# Patient Record
Sex: FEMALE | Race: WHITE | NOT HISPANIC OR LATINO | Employment: OTHER | URBAN - METROPOLITAN AREA
[De-identification: names, ages, dates, MRNs, and addresses within clinical notes are randomized per-mention and may not be internally consistent; named-entity substitution may affect disease eponyms.]

---

## 2017-01-12 ENCOUNTER — HOSPITAL ENCOUNTER (OUTPATIENT)
Dept: MAMMOGRAPHY | Facility: HOSPITAL | Age: 72
Discharge: HOME | End: 2017-01-12
Admitting: INTERNAL MEDICINE

## 2017-01-12 DIAGNOSIS — Z12.31 VISIT FOR SCREENING MAMMOGRAM: ICD-10-CM

## 2017-01-12 PROCEDURE — 77063 BREAST TOMOSYNTHESIS BI: CPT

## 2017-01-12 PROCEDURE — 77063 BREAST TOMOSYNTHESIS BI: CPT | Performed by: RADIOLOGY

## 2017-01-12 PROCEDURE — G0202 SCR MAMMO BI INCL CAD: HCPCS | Performed by: RADIOLOGY

## 2017-01-12 PROCEDURE — G0202 SCR MAMMO BI INCL CAD: HCPCS

## 2018-05-07 ENCOUNTER — HOSPITAL ENCOUNTER (OUTPATIENT)
Age: 73
End: 2018-05-07
Payer: MEDICARE

## 2018-05-07 DIAGNOSIS — M25.511: Primary | ICD-10-CM

## 2018-05-07 PROCEDURE — 73030 X-RAY EXAM OF SHOULDER: CPT

## 2019-07-22 ENCOUNTER — TELEPHONE (OUTPATIENT)
Dept: FAMILY MEDICINE CLINIC | Facility: CLINIC | Age: 74
End: 2019-07-22

## 2019-08-05 ENCOUNTER — HOSPITAL ENCOUNTER (OUTPATIENT)
Age: 74
End: 2019-08-05
Payer: MEDICARE

## 2019-08-05 DIAGNOSIS — M79.672: Primary | ICD-10-CM

## 2019-08-05 PROCEDURE — 73630 X-RAY EXAM OF FOOT: CPT

## 2019-09-04 ENCOUNTER — HOSPITAL ENCOUNTER (OUTPATIENT)
Age: 74
End: 2019-09-04
Payer: MEDICARE

## 2019-09-04 DIAGNOSIS — G47.30: Primary | ICD-10-CM

## 2019-09-04 DIAGNOSIS — R06.83: ICD-10-CM

## 2019-09-04 DIAGNOSIS — R40.0: ICD-10-CM

## 2019-09-04 DIAGNOSIS — I10: ICD-10-CM

## 2019-09-04 PROCEDURE — G0399 HOME SLEEP TEST/TYPE 3 PORTA: HCPCS

## 2019-09-07 ENCOUNTER — HOSPITAL ENCOUNTER (OUTPATIENT)
Age: 74
End: 2019-09-07
Payer: MEDICARE

## 2019-09-07 DIAGNOSIS — M25.521: ICD-10-CM

## 2019-09-07 DIAGNOSIS — M25.561: ICD-10-CM
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
## 2019-09-07 DIAGNOSIS — M25.531: Primary | ICD-10-CM

## 2019-09-07 PROCEDURE — 73562 X-RAY EXAM OF KNEE 3: CPT

## 2019-09-07 PROCEDURE — 73110 X-RAY EXAM OF WRIST: CPT

## 2019-09-07 PROCEDURE — 73080 X-RAY EXAM OF ELBOW: CPT

## 2020-03-02 ENCOUNTER — TRANSCRIBE ORDERS (OUTPATIENT)
Dept: ADMINISTRATIVE | Facility: HOSPITAL | Age: 75
End: 2020-03-02

## 2020-09-05 ENCOUNTER — HOSPITAL ENCOUNTER (OUTPATIENT)
Age: 75
End: 2020-09-05
Payer: MEDICARE

## 2020-09-05 DIAGNOSIS — Z03.818: Primary | ICD-10-CM

## 2020-09-05 PROCEDURE — U0003 INFECTIOUS AGENT DETECTION BY NUCLEIC ACID (DNA OR RNA); SEVERE ACUTE RESPIRATORY SYNDROME CORONAVIRUS 2 (SARS-COV-2) (CORONAVIRUS DISEASE [COVID-19]), AMPLIFIED PROBE TECHNIQUE, MAKING USE OF HIGH THROUGHPUT TECHNOLOGIES AS DESCRIBED BY CMS-2020-01-R: HCPCS

## 2020-11-05 ENCOUNTER — HOSPITAL ENCOUNTER (EMERGENCY)
Age: 75
Discharge: HOME | End: 2020-11-05
Payer: MEDICARE

## 2020-11-05 VITALS
OXYGEN SATURATION: 95 % | HEART RATE: 84 BPM | RESPIRATION RATE: 17 BRPM | TEMPERATURE: 97.7 F | BODY MASS INDEX: 27.4 KG/M2 | DIASTOLIC BLOOD PRESSURE: 100 MMHG | SYSTOLIC BLOOD PRESSURE: 170 MMHG

## 2020-11-05 VITALS
OXYGEN SATURATION: 98 % | SYSTOLIC BLOOD PRESSURE: 137 MMHG | HEART RATE: 70 BPM | RESPIRATION RATE: 16 BRPM | DIASTOLIC BLOOD PRESSURE: 84 MMHG | TEMPERATURE: 97.88 F

## 2020-11-05 DIAGNOSIS — Z79.899: ICD-10-CM

## 2020-11-05 DIAGNOSIS — Z90.49: ICD-10-CM

## 2020-11-05 DIAGNOSIS — I10: ICD-10-CM

## 2020-11-05 DIAGNOSIS — E78.5: ICD-10-CM

## 2020-11-05 DIAGNOSIS — Z91.040: ICD-10-CM

## 2020-11-05 DIAGNOSIS — F43.22: ICD-10-CM

## 2020-11-05 DIAGNOSIS — E11.9: ICD-10-CM

## 2020-11-05 DIAGNOSIS — F32.89: Primary | ICD-10-CM

## 2020-11-05 DIAGNOSIS — Z88.5: ICD-10-CM

## 2020-11-05 DIAGNOSIS — Z88.0: ICD-10-CM

## 2020-11-05 DIAGNOSIS — Z90.79: ICD-10-CM

## 2020-11-05 DIAGNOSIS — K21.9: ICD-10-CM

## 2020-11-05 PROCEDURE — 82962 GLUCOSE BLOOD TEST: CPT

## 2020-11-05 PROCEDURE — 99281 EMR DPT VST MAYX REQ PHY/QHP: CPT

## 2020-12-03 LAB — GLUCOSE BLDC GLUCOMTR-MCNC: 127 MG/DL (ref 70–110)

## 2020-12-11 ENCOUNTER — HOSPITAL ENCOUNTER (OUTPATIENT)
Age: 75
End: 2020-12-11
Payer: MEDICARE

## 2020-12-11 DIAGNOSIS — Z12.31: Primary | ICD-10-CM

## 2020-12-11 PROCEDURE — 77067 SCR MAMMO BI INCL CAD: CPT

## 2020-12-11 PROCEDURE — 77063 BREAST TOMOSYNTHESIS BI: CPT

## 2021-03-30 ENCOUNTER — OFFICE VISIT (OUTPATIENT)
Dept: OBSTETRICS AND GYNECOLOGY | Facility: CLINIC | Age: 76
End: 2021-03-30

## 2021-03-30 VITALS
TEMPERATURE: 97.5 F | WEIGHT: 167 LBS | SYSTOLIC BLOOD PRESSURE: 130 MMHG | HEIGHT: 65 IN | DIASTOLIC BLOOD PRESSURE: 82 MMHG | BODY MASS INDEX: 27.82 KG/M2

## 2021-03-30 DIAGNOSIS — N81.10 CYSTOCELE WITHOUT UTERINE PROLAPSE: Primary | ICD-10-CM

## 2021-03-30 PROCEDURE — G0101 CA SCREEN;PELVIC/BREAST EXAM: HCPCS | Performed by: OBSTETRICS & GYNECOLOGY

## 2021-03-30 RX ORDER — LEVOTHYROXINE SODIUM 0.05 MG/1
50 TABLET ORAL DAILY
COMMUNITY
Start: 2021-01-07

## 2021-03-30 RX ORDER — BUSPIRONE HYDROCHLORIDE 15 MG/1
15 TABLET ORAL NIGHTLY
COMMUNITY
Start: 2021-03-18

## 2021-03-30 RX ORDER — ATORVASTATIN CALCIUM 40 MG/1
40 TABLET, FILM COATED ORAL
COMMUNITY
Start: 2021-03-18

## 2021-03-30 RX ORDER — LEVOCETIRIZINE DIHYDROCHLORIDE 5 MG/1
5 TABLET, FILM COATED ORAL EVERY EVENING
COMMUNITY
Start: 2020-12-29

## 2021-03-30 RX ORDER — LORAZEPAM 1 MG/1
1 TABLET ORAL NIGHTLY PRN
COMMUNITY
Start: 2021-03-18

## 2021-03-30 RX ORDER — DULOXETIN HYDROCHLORIDE 60 MG/1
120 CAPSULE, DELAYED RELEASE ORAL EVERY MORNING
COMMUNITY
Start: 2021-02-25

## 2021-03-30 RX ORDER — FLUCONAZOLE 150 MG/1
150 TABLET ORAL DAILY
COMMUNITY
Start: 2021-02-10 | End: 2022-02-22

## 2021-03-30 RX ORDER — LOSARTAN POTASSIUM 100 MG/1
100 TABLET ORAL DAILY
COMMUNITY
Start: 2021-03-16

## 2021-06-30 ENCOUNTER — HOSPITAL ENCOUNTER (OUTPATIENT)
Age: 76
End: 2021-06-30
Payer: MEDICARE

## 2021-06-30 DIAGNOSIS — E78.5: Primary | ICD-10-CM

## 2021-06-30 DIAGNOSIS — M81.0: ICD-10-CM

## 2021-06-30 LAB
25-OH VITAMIN D, TOTAL: 38.9 NG/ML (ref 30–100)
ALBUMIN LEVEL: 4.1 G/DL (ref 3.5–5)
ALBUMIN/GLOB SERPL: 1.4 {RATIO} (ref 1.1–1.8)
ALP ISO SERPL-ACNC: 104 U/L (ref 38–126)
ALT SERPLBLD-CCNC: 27 U/L (ref 12–78)
ANION GAP SERPL CALC-SCNC: 16.3 MEQ/L (ref 5–15)
AST SERPL QL: 31 U/L (ref 14–36)
BILIRUBIN,TOTAL: 0.8 MG/DL (ref 0.2–1.3)
BUN SERPL-MCNC: 23 MG/DL (ref 7–17)
CALCIUM SPEC-MCNC: 9.1 MG/DL (ref 8.4–10.2)
CHLORIDE SPEC-SCNC: 107 MMOL/L (ref 98–107)
CHOLEST SPEC-SCNC: 163 MG/DL (ref 140–200)
CO2 SERPL-SCNC: 21 MMOL/L (ref 22–30)
CREAT BLD-SCNC: 0.8 MG/DL (ref 0.52–1.04)
ESTIMATED GLOMERULAR FILT RATE: 70 ML/MIN (ref 60–?)
GFR (AFRICAN AMERICAN): 85 ML/MIN (ref 60–?)
GLOBULIN SER CALC-MCNC: 3 G/DL (ref 1.3–3.2)
GLUCOSE: 109 MG/DL (ref 74–100)
HCT VFR BLD CALC: 43.8 % (ref 37–47)
HDLC SERPL-MCNC: 50 MG/DL (ref 40–60)
HGB BLD-MCNC: 14.8 G/DL (ref 12.2–16.2)
MAGNESIUM: 2 MG/DL (ref 1.6–2.3)
MCHC RBC-ENTMCNC: 33.9 G/DL (ref 31.8–35.4)
MCV RBC: 84.2 FL (ref 81–99)
MEAN CORPUSCULAR HEMOGLOBIN: 28.5 PG (ref 27–31.2)
PLATELET # BLD: 238 K/MM3 (ref 142–424)
POTASSIUM: 4.3 MMOL/L (ref 3.5–5.1)
PROT SERPL-MCNC: 7.1 G/DL (ref 6.3–8.2)
RBC # BLD AUTO: 5.2 M/MM3 (ref 4.2–5.4)
SODIUM SPEC-SCNC: 140 MMOL/L (ref 136–145)
TRIGLYCERIDES: 169 MG/DL (ref 30–150)
WBC # BLD AUTO: 6.2 K/MM3 (ref 4.8–10.8)

## 2021-06-30 PROCEDURE — 36415 COLL VENOUS BLD VENIPUNCTURE: CPT

## 2021-06-30 PROCEDURE — 83735 ASSAY OF MAGNESIUM: CPT

## 2021-06-30 PROCEDURE — 80061 LIPID PANEL: CPT

## 2021-06-30 PROCEDURE — 82306 VITAMIN D 25 HYDROXY: CPT

## 2021-06-30 PROCEDURE — 80053 COMPREHEN METABOLIC PANEL: CPT

## 2021-06-30 PROCEDURE — 85025 COMPLETE CBC W/AUTO DIFF WBC: CPT

## 2021-11-08 ENCOUNTER — OFFICE (OUTPATIENT)
Dept: URBAN - METROPOLITAN AREA PATHOLOGY 4 | Facility: PATHOLOGY | Age: 76
End: 2021-11-08

## 2021-11-08 ENCOUNTER — AMBULATORY SURGICAL CENTER (OUTPATIENT)
Dept: URBAN - METROPOLITAN AREA SURGERY 10 | Facility: SURGERY | Age: 76
End: 2021-11-08

## 2021-11-08 DIAGNOSIS — Z80.0 FAMILY HISTORY OF MALIGNANT NEOPLASM OF DIGESTIVE ORGANS: ICD-10-CM

## 2021-11-08 DIAGNOSIS — Z86.010 PERSONAL HISTORY OF COLONIC POLYPS: ICD-10-CM

## 2021-11-08 DIAGNOSIS — D12.0 BENIGN NEOPLASM OF CECUM: ICD-10-CM

## 2021-11-08 DIAGNOSIS — K57.30 DIVERTICULOSIS OF LARGE INTESTINE WITHOUT PERFORATION OR ABS: ICD-10-CM

## 2021-11-08 DIAGNOSIS — D12.3 BENIGN NEOPLASM OF TRANSVERSE COLON: ICD-10-CM

## 2021-11-08 DIAGNOSIS — K64.0 FIRST DEGREE HEMORRHOIDS: ICD-10-CM

## 2021-11-08 PROCEDURE — 88305 TISSUE EXAM BY PATHOLOGIST: CPT | Performed by: INTERNAL MEDICINE

## 2021-11-08 PROCEDURE — 45385 COLONOSCOPY W/LESION REMOVAL: CPT | Mod: PT | Performed by: INTERNAL MEDICINE

## 2021-12-02 ENCOUNTER — HOSPITAL ENCOUNTER (OUTPATIENT)
Age: 76
End: 2021-12-02
Payer: MEDICARE

## 2021-12-02 DIAGNOSIS — R05.9: Primary | ICD-10-CM

## 2021-12-02 DIAGNOSIS — H65.23: ICD-10-CM

## 2021-12-02 PROCEDURE — 71046 X-RAY EXAM CHEST 2 VIEWS: CPT

## 2021-12-17 ENCOUNTER — HOSPITAL ENCOUNTER (OUTPATIENT)
Age: 76
End: 2021-12-17
Payer: MEDICARE

## 2021-12-17 DIAGNOSIS — R91.1: Primary | ICD-10-CM

## 2021-12-17 LAB
BUN SERPL-MCNC: 22 MG/DL (ref 7–17)
CREAT BLD-SCNC: 0.9 MG/DL (ref 0.52–1.04)
ESTIMATED GLOMERULAR FILT RATE: 61 ML/MIN (ref 60–?)
GFR (AFRICAN AMERICAN): 74 ML/MIN (ref 60–?)

## 2021-12-17 PROCEDURE — 82565 ASSAY OF CREATININE: CPT

## 2021-12-17 PROCEDURE — 84520 ASSAY OF UREA NITROGEN: CPT

## 2021-12-17 PROCEDURE — 36415 COLL VENOUS BLD VENIPUNCTURE: CPT

## 2021-12-17 PROCEDURE — 71260 CT THORAX DX C+: CPT

## 2022-01-04 ENCOUNTER — HOSPITAL ENCOUNTER (OUTPATIENT)
Age: 77
End: 2022-01-04
Payer: MEDICARE

## 2022-01-04 DIAGNOSIS — Z20.822: Primary | ICD-10-CM

## 2022-01-04 PROCEDURE — U0005 INFEC AGEN DETEC AMPLI PROBE: HCPCS

## 2022-01-04 PROCEDURE — C9803 HOPD COVID-19 SPEC COLLECT: HCPCS

## 2022-01-04 PROCEDURE — U0003 INFECTIOUS AGENT DETECTION BY NUCLEIC ACID (DNA OR RNA); SEVERE ACUTE RESPIRATORY SYNDROME CORONAVIRUS 2 (SARS-COV-2) (CORONAVIRUS DISEASE [COVID-19]), AMPLIFIED PROBE TECHNIQUE, MAKING USE OF HIGH THROUGHPUT TECHNOLOGIES AS DESCRIBED BY CMS-2020-01-R: HCPCS

## 2022-01-20 ENCOUNTER — TRANSCRIBE ORDERS (OUTPATIENT)
Dept: ADMINISTRATIVE | Facility: HOSPITAL | Age: 77
End: 2022-01-20

## 2022-01-27 ENCOUNTER — TRANSCRIBE ORDERS (OUTPATIENT)
Dept: ADMINISTRATIVE | Facility: HOSPITAL | Age: 77
End: 2022-01-27

## 2022-01-27 DIAGNOSIS — R91.1 PULMONARY NODULE 1 CM OR GREATER IN DIAMETER: Primary | ICD-10-CM

## 2022-02-09 ENCOUNTER — HOSPITAL ENCOUNTER (OUTPATIENT)
Dept: PET IMAGING | Facility: HOSPITAL | Age: 77
Discharge: HOME | End: 2022-02-09

## 2022-02-09 DIAGNOSIS — R91.1 PULMONARY NODULE 1 CM OR GREATER IN DIAMETER: ICD-10-CM

## 2022-02-09 LAB — GLUCOSE BLDC GLUCOMTR-MCNC: 93 MG/DL (ref 70–130)

## 2022-02-09 PROCEDURE — A9552 F18 FDG: HCPCS | Performed by: INTERNAL MEDICINE

## 2022-02-09 PROCEDURE — 82962 GLUCOSE BLOOD TEST: CPT

## 2022-02-09 PROCEDURE — 0 FLUDEOXYGLUCOSE F18 SOLUTION: Performed by: INTERNAL MEDICINE

## 2022-02-09 PROCEDURE — 78815 PET IMAGE W/CT SKULL-THIGH: CPT

## 2022-02-09 RX ADMIN — FLUDEOXYGLUCOSE F18 1 DOSE: 300 INJECTION INTRAVENOUS at 13:49

## 2022-02-22 ENCOUNTER — OFFICE VISIT (OUTPATIENT)
Dept: PULMONOLOGY | Facility: CLINIC | Age: 77
End: 2022-02-22

## 2022-02-22 ENCOUNTER — NURSE NAVIGATOR (OUTPATIENT)
Dept: ONCOLOGY | Facility: CLINIC | Age: 77
End: 2022-02-22

## 2022-02-22 ENCOUNTER — PREP FOR SURGERY (OUTPATIENT)
Dept: OTHER | Facility: HOSPITAL | Age: 77
End: 2022-02-22

## 2022-02-22 VITALS
WEIGHT: 174.25 LBS | HEIGHT: 65 IN | OXYGEN SATURATION: 96 % | HEART RATE: 84 BPM | SYSTOLIC BLOOD PRESSURE: 142 MMHG | RESPIRATION RATE: 18 BRPM | DIASTOLIC BLOOD PRESSURE: 80 MMHG | TEMPERATURE: 97.7 F | BODY MASS INDEX: 29.03 KG/M2

## 2022-02-22 DIAGNOSIS — R91.1 LUNG NODULE: Primary | ICD-10-CM

## 2022-02-22 PROCEDURE — 99204 OFFICE O/P NEW MOD 45 MIN: CPT | Performed by: INTERNAL MEDICINE

## 2022-02-22 RX ORDER — PRAZOSIN HYDROCHLORIDE 1 MG/1
1 CAPSULE ORAL
COMMUNITY
Start: 2022-02-09

## 2022-02-22 RX ORDER — SODIUM CHLORIDE 0.9 % (FLUSH) 0.9 %
10 SYRINGE (ML) INJECTION AS NEEDED
Status: CANCELLED | OUTPATIENT
Start: 2022-02-22

## 2022-02-22 RX ORDER — SODIUM CHLORIDE 9 MG/ML
125 INJECTION, SOLUTION INTRAVENOUS CONTINUOUS
Status: CANCELLED | OUTPATIENT
Start: 2022-02-22

## 2022-02-22 RX ORDER — FLUTICASONE PROPIONATE 50 MCG
2 SPRAY, SUSPENSION (ML) NASAL DAILY
COMMUNITY

## 2022-02-22 RX ORDER — TRAZODONE HYDROCHLORIDE 50 MG/1
50 TABLET ORAL NIGHTLY PRN
COMMUNITY
Start: 2021-12-28

## 2022-02-22 RX ORDER — OMEPRAZOLE 20 MG/1
20 CAPSULE, DELAYED RELEASE ORAL 2 TIMES DAILY
COMMUNITY
Start: 2022-01-21

## 2022-02-22 RX ORDER — SODIUM CHLORIDE 0.9 % (FLUSH) 0.9 %
3 SYRINGE (ML) INJECTION EVERY 12 HOURS SCHEDULED
Status: CANCELLED | OUTPATIENT
Start: 2022-02-22

## 2022-02-22 RX ORDER — LIDOCAINE HYDROCHLORIDE 40 MG/ML
4 INJECTION, SOLUTION RETROBULBAR; TOPICAL ONCE
Status: CANCELLED | OUTPATIENT
Start: 2022-02-22 | End: 2022-02-22

## 2022-02-22 NOTE — PROGRESS NOTES
Met patient and sister in lung nodule clinic with Dr. Edwards. She is a lifetime non-smoker who had been experiencing low grade fevers and cough. This lead to CT chest revealing 1.28cm RUL nodule. Subsequent PET obtained with SUV of 2.7 in this nodule. Sister who was with patient recently diagnosed with breast cancer. Patient denies any unexplained weight loss or hemoptysis. Scans reviewed. Per Dr. Edwards proceed with navigational bronch now. She will need [employer] CT and PAT. Introduced lung navigator role and provided contact information. She v/u and agreeable to plan. She knows to call with questions or concerns.

## 2022-02-22 NOTE — PROGRESS NOTES
New Patient Pulmonary Office Visit      Patient Name: Tati Rapp    Referring Physician: Matthew Barry MD    Chief Complaint:    Chief Complaint   Patient presents with   • Lung Nodule   • Cough       History of Present Illness: Tati Rapp is a 76 y.o. female who is here today to establish care with Pulmonary.  Patient has a past medical history sniffing for diabetes, hyperparathyroidism, allergic rhinitis, and hyperlipidemia.  Is referred to pulmonary for evaluation of pulmonary nodule.  She states that she initially had a cough and that led to a chest x-ray which showed an abnormality.  And then she had a CT scan showing a 1.3 cm right upper lobe noncalcified nodule.  She states that she is a never smoker does have a history of histoplasmosis.  She has no previous CT scans to compare to.  She denies any fever, chills, nausea, or vomiting.  She denies any significant weight loss.  She has no other acute complaints    Review of Systems:   Review of Systems   Constitutional: Negative for activity change, appetite change, chills and diaphoresis.   HENT: Negative for congestion, postnasal drip, sinus pressure and voice change.    Eyes: Negative for blurred vision.   Respiratory: Negative for cough, shortness of breath and wheezing.    Cardiovascular: Negative for chest pain.   Gastrointestinal: Negative for abdominal pain.   Musculoskeletal: Negative for myalgias.   Skin: Negative for color change and dry skin.   Allergic/Immunologic: Negative for environmental allergies.   Neurological: Negative for weakness and confusion.   Hematological: Negative for adenopathy.   Psychiatric/Behavioral: Negative for sleep disturbance and depressed mood.       Past Medical History:   Past Medical History:   Diagnosis Date   • Anxiety    • Diabetes (HCC)    • HyperPTH (HCC)        Past Surgical History:   Past Surgical History:   Procedure Laterality Date   • ANTERIOR AND POSTERIOR VAGINAL REPAIR Bilateral    •  BREAST BIOPSY Left 11/2015   • BREAST BIOPSY Right 1986   • BREAST BIOPSY Right 1982   • HYSTERECTOMY     • LAPAROSCOPIC CHOLECYSTECTOMY     • OOPHORECTOMY     • TOTAL ABDOMINAL HYSTERECTOMY WITH SALPINGO OOPHORECTOMY         Family History:   Family History   Problem Relation Age of Onset   • No Known Problems Mother    • Hypertension Father    • No Known Problems Sister    • No Known Problems Brother    • No Known Problems Daughter    • No Known Problems Son    • No Known Problems Maternal Grandmother    • No Known Problems Paternal Grandmother    • No Known Problems Maternal Aunt    • No Known Problems Paternal Aunt    • BRCA 1/2 Neg Hx    • Breast cancer Neg Hx    • Colon cancer Neg Hx    • Endometrial cancer Neg Hx    • Ovarian cancer Neg Hx        Social History:   Social History     Socioeconomic History   • Marital status:    Tobacco Use   • Smoking status: Never Smoker   • Smokeless tobacco: Never Used   Substance and Sexual Activity   • Alcohol use: Never   • Drug use: Never   • Sexual activity: Never       Medications:     Current Outpatient Medications:   •  atorvastatin (LIPITOR) 40 MG tablet, Take 40 mg by mouth every night at bedtime., Disp: , Rfl:   •  busPIRone (BUSPAR) 15 MG tablet, Take 15 mg by mouth Every Night., Disp: , Rfl:   •  DULoxetine (CYMBALTA) 60 MG capsule, Take 120 mg by mouth Every Morning., Disp: , Rfl:   •  fluticasone (FLONASE) 50 MCG/ACT nasal spray, 2 sprays into the nostril(s) as directed by provider Daily., Disp: , Rfl:   •  levocetirizine (XYZAL) 5 MG tablet, Take 5 mg by mouth Every Evening., Disp: , Rfl:   •  levothyroxine (SYNTHROID, LEVOTHROID) 50 MCG tablet, Take 50 mcg by mouth Daily., Disp: , Rfl:   •  LORazepam (ATIVAN) 1 MG tablet, Take 1 mg by mouth At Night As Needed., Disp: , Rfl:   •  losartan (COZAAR) 100 MG tablet, Take 100 mg by mouth Daily., Disp: , Rfl:   •  omeprazole (priLOSEC) 20 MG capsule, Take 20 mg by mouth 2 (Two) Times a Day., Disp: , Rfl:  "  •  prazosin (MINIPRESS) 1 MG capsule, Take 1 mg by mouth every night at bedtime., Disp: , Rfl:   •  traZODone (DESYREL) 50 MG tablet, Take 50 mg by mouth every night at bedtime., Disp: , Rfl:     Allergies:   Allergies   Allergen Reactions   • Estrogens Unknown - Low Severity     Fibrocystic breast    • Rut Tree Pollen [Corylus] Itching   • Latex Itching   • Methylprednisolone Itching   • Dust Mite Extract Rash   • Mold Extract [Trichophyton] Rash   • Penicillins Rash   • Sulfa Antibiotics Rash   • Tobacco Rash       Physical Exam:  Vital Signs:   Vitals:    02/22/22 1105   BP: 142/80   BP Location: Right arm   Patient Position: Sitting   Cuff Size: Adult   Pulse: 84   Resp: 18   Temp: 97.7 °F (36.5 °C)   SpO2: 96%  Comment: room air at rest   Weight: 79 kg (174 lb 4 oz)   Height: 165.1 cm (65\")       Physical Exam  Vitals and nursing note reviewed.   Constitutional:       General: She is not in acute distress.     Appearance: She is well-developed and normal weight. She is not ill-appearing or toxic-appearing.   HENT:      Head: Normocephalic and atraumatic.   Cardiovascular:      Rate and Rhythm: Normal rate and regular rhythm.      Pulses: Normal pulses.      Heart sounds: Normal heart sounds. No murmur heard.  No friction rub. No gallop.    Pulmonary:      Effort: Pulmonary effort is normal. No respiratory distress.      Breath sounds: Normal breath sounds. No wheezing, rhonchi or rales.   Musculoskeletal:      Right lower leg: No edema.      Left lower leg: No edema.   Skin:     General: Skin is warm and dry.   Neurological:      Mental Status: She is alert and oriented to person, place, and time.         Immunization History   Administered Date(s) Administered   • COVID-19 (MODERNA) 1st, 2nd, 3rd Dose Only 03/31/2021, 05/12/2021       Results Review:   - I personally reviewed the pts imaging from PET scan 2/9/2022 showed a 1.28 cm right upper lobe noncalcified nodule with a maximum SUV of 2.7  - I " personally reviewed the pts chart with regards to evaluation by his PCP.    Assessment / Plan:   1. 1.3 cm right upper lobe noncalcified nodule (2/2022) (Primary)  -I discussed the CT findings with the patient.  Unfortunately with it being PET positive at a very low level it is difficult for marrow to entirely rule out malignancy.  The fact that she does not smoke and the fact that she has histo makes me think that this may be a granuloma, although I think a biopsy would be warranted to confirm that the fact that there is or is not cancer present.  She does have a fairly strong family history of cancer although not lung cancer specifically.  I explained her that I would have her get an I logic CT scan so that we can perform a navigational bronchoscopy at that time I will also do an EBUS to ensure that there are no enlarged lymph nodes in the right hilum.  I explained her the risk and benefits of the procedure, she verbalized understanding of all this and agreed with the plan.  We will likely plan for the bronchoscopy performed next week.      Follow Up:   Return in about 4 weeks (around 3/22/2022).     SAMEERA Edwards, DO  Pulmonary and Critical Care Medicine  Note Electronically Signed    Part of this note may be an electronic transcription/translation of spoken language to printed text using the Dragon Dictation System.

## 2022-02-24 ENCOUNTER — NURSE NAVIGATOR (OUTPATIENT)
Dept: ONCOLOGY | Facility: CLINIC | Age: 77
End: 2022-02-24

## 2022-02-24 ENCOUNTER — APPOINTMENT (OUTPATIENT)
Dept: CT IMAGING | Facility: HOSPITAL | Age: 77
End: 2022-02-24

## 2022-02-24 NOTE — PROGRESS NOTES
Patient called with questions regarding upcoming schedule and procedures. Answered questions satisfactorily. Asked if she needed to stop taking aspirin 81 and informed her should could continue as it was not contraindicated with bronch. Provided her with directions to PAT and main registration. She v/u and knows to call with questions or concerns.

## 2022-03-01 ENCOUNTER — HOSPITAL ENCOUNTER (OUTPATIENT)
Dept: CT IMAGING | Facility: HOSPITAL | Age: 77
Discharge: HOME | End: 2022-03-01
Admitting: INTERNAL MEDICINE

## 2022-03-01 ENCOUNTER — PRE-ADMISSION TESTING (OUTPATIENT)
Dept: PREADMISSION TESTING | Facility: HOSPITAL | Age: 77
End: 2022-03-01

## 2022-03-01 VITALS — HEIGHT: 65 IN | WEIGHT: 171.63 LBS | BODY MASS INDEX: 28.6 KG/M2

## 2022-03-01 DIAGNOSIS — R91.1 LUNG NODULE: ICD-10-CM

## 2022-03-01 LAB
ALBUMIN SERPL-MCNC: 4 G/DL (ref 3.5–5.2)
ALBUMIN/GLOB SERPL: 1.4 G/DL
ALP SERPL-CCNC: 93 U/L (ref 39–117)
ALT SERPL W P-5'-P-CCNC: 27 U/L (ref 1–33)
ANION GAP SERPL CALCULATED.3IONS-SCNC: 9 MMOL/L (ref 5–15)
AST SERPL-CCNC: 24 U/L (ref 1–32)
BASOPHILS # BLD AUTO: 0.08 10*3/MM3 (ref 0–0.2)
BASOPHILS NFR BLD AUTO: 1.1 % (ref 0–1.5)
BILIRUB SERPL-MCNC: 0.4 MG/DL (ref 0–1.2)
BUN SERPL-MCNC: 25 MG/DL (ref 8–23)
BUN/CREAT SERPL: 29.1 (ref 7–25)
CALCIUM SPEC-SCNC: 9 MG/DL (ref 8.6–10.5)
CHLORIDE SERPL-SCNC: 105 MMOL/L (ref 98–107)
CO2 SERPL-SCNC: 26 MMOL/L (ref 22–29)
CREAT SERPL-MCNC: 0.86 MG/DL (ref 0.57–1)
DEPRECATED RDW RBC AUTO: 40 FL (ref 37–54)
EGFRCR SERPLBLD CKD-EPI 2021: 70.6 ML/MIN/1.73
EOSINOPHIL # BLD AUTO: 0.17 10*3/MM3 (ref 0–0.4)
EOSINOPHIL NFR BLD AUTO: 2.4 % (ref 0.3–6.2)
ERYTHROCYTE [DISTWIDTH] IN BLOOD BY AUTOMATED COUNT: 12.7 % (ref 12.3–15.4)
GLOBULIN UR ELPH-MCNC: 2.8 GM/DL
GLUCOSE SERPL-MCNC: 90 MG/DL (ref 65–99)
HCT VFR BLD AUTO: 44.4 % (ref 34–46.6)
HGB BLD-MCNC: 14.5 G/DL (ref 12–15.9)
IMM GRANULOCYTES # BLD AUTO: 0.02 10*3/MM3 (ref 0–0.05)
IMM GRANULOCYTES NFR BLD AUTO: 0.3 % (ref 0–0.5)
INR PPP: 1.05 (ref 0.85–1.16)
LYMPHOCYTES # BLD AUTO: 1.77 10*3/MM3 (ref 0.7–3.1)
LYMPHOCYTES NFR BLD AUTO: 24.5 % (ref 19.6–45.3)
MCH RBC QN AUTO: 28.3 PG (ref 26.6–33)
MCHC RBC AUTO-ENTMCNC: 32.7 G/DL (ref 31.5–35.7)
MCV RBC AUTO: 86.7 FL (ref 79–97)
MONOCYTES # BLD AUTO: 0.84 10*3/MM3 (ref 0.1–0.9)
MONOCYTES NFR BLD AUTO: 11.7 % (ref 5–12)
NEUTROPHILS NFR BLD AUTO: 4.33 10*3/MM3 (ref 1.7–7)
NEUTROPHILS NFR BLD AUTO: 60 % (ref 42.7–76)
NRBC BLD AUTO-RTO: 0 /100 WBC (ref 0–0.2)
PLATELET # BLD AUTO: 232 10*3/MM3 (ref 140–450)
PMV BLD AUTO: 9.9 FL (ref 6–12)
POTASSIUM SERPL-SCNC: 4.5 MMOL/L (ref 3.5–5.2)
PROT SERPL-MCNC: 6.8 G/DL (ref 6–8.5)
PROTHROMBIN TIME: 13.4 SECONDS (ref 11.4–14.4)
QT INTERVAL: 372 MS
QTC INTERVAL: 426 MS
RBC # BLD AUTO: 5.12 10*6/MM3 (ref 3.77–5.28)
SARS-COV-2 RNA PNL SPEC NAA+PROBE: NOT DETECTED
SODIUM SERPL-SCNC: 140 MMOL/L (ref 136–145)
WBC NRBC COR # BLD: 7.21 10*3/MM3 (ref 3.4–10.8)

## 2022-03-01 PROCEDURE — 71250 CT THORAX DX C-: CPT

## 2022-03-01 PROCEDURE — 85025 COMPLETE CBC W/AUTO DIFF WBC: CPT

## 2022-03-01 PROCEDURE — C9803 HOPD COVID-19 SPEC COLLECT: HCPCS

## 2022-03-01 PROCEDURE — 36415 COLL VENOUS BLD VENIPUNCTURE: CPT

## 2022-03-01 PROCEDURE — 93010 ELECTROCARDIOGRAM REPORT: CPT | Performed by: INTERNAL MEDICINE

## 2022-03-01 PROCEDURE — 93005 ELECTROCARDIOGRAM TRACING: CPT

## 2022-03-01 PROCEDURE — 85610 PROTHROMBIN TIME: CPT

## 2022-03-01 PROCEDURE — U0004 COV-19 TEST NON-CDC HGH THRU: HCPCS

## 2022-03-01 PROCEDURE — 80053 COMPREHEN METABOLIC PANEL: CPT

## 2022-03-01 PROCEDURE — U0005 INFEC AGEN DETEC AMPLI PROBE: HCPCS

## 2022-03-01 NOTE — PAT
Per Anesthesia Request, patient instructed not to take their ACE/ARB medications on the AM of surgery.

## 2022-03-03 ENCOUNTER — APPOINTMENT (OUTPATIENT)
Dept: GENERAL RADIOLOGY | Facility: HOSPITAL | Age: 77
End: 2022-03-03

## 2022-03-03 ENCOUNTER — HOSPITAL ENCOUNTER (OUTPATIENT)
Facility: HOSPITAL | Age: 77
Setting detail: HOSPITAL OUTPATIENT SURGERY
Discharge: HOME | End: 2022-03-03
Attending: INTERNAL MEDICINE | Admitting: INTERNAL MEDICINE

## 2022-03-03 ENCOUNTER — ANESTHESIA EVENT (OUTPATIENT)
Dept: GASTROENTEROLOGY | Facility: HOSPITAL | Age: 77
End: 2022-03-03

## 2022-03-03 ENCOUNTER — ANESTHESIA (OUTPATIENT)
Dept: GASTROENTEROLOGY | Facility: HOSPITAL | Age: 77
End: 2022-03-03

## 2022-03-03 VITALS
TEMPERATURE: 97 F | DIASTOLIC BLOOD PRESSURE: 75 MMHG | HEART RATE: 90 BPM | SYSTOLIC BLOOD PRESSURE: 139 MMHG | OXYGEN SATURATION: 94 % | RESPIRATION RATE: 16 BRPM

## 2022-03-03 DIAGNOSIS — R91.1 LUNG NODULE: ICD-10-CM

## 2022-03-03 LAB — GLUCOSE BLDC GLUCOMTR-MCNC: 88 MG/DL (ref 70–130)

## 2022-03-03 PROCEDURE — 31652 BRONCH EBUS SAMPLNG 1/2 NODE: CPT | Performed by: INTERNAL MEDICINE

## 2022-03-03 PROCEDURE — 88112 CYTOPATH CELL ENHANCE TECH: CPT | Performed by: INTERNAL MEDICINE

## 2022-03-03 PROCEDURE — 87205 SMEAR GRAM STAIN: CPT | Performed by: INTERNAL MEDICINE

## 2022-03-03 PROCEDURE — 71045 X-RAY EXAM CHEST 1 VIEW: CPT

## 2022-03-03 PROCEDURE — 31623 DX BRONCHOSCOPE/BRUSH: CPT | Performed by: INTERNAL MEDICINE

## 2022-03-03 PROCEDURE — 31628 BRONCHOSCOPY/LUNG BX EACH: CPT | Performed by: INTERNAL MEDICINE

## 2022-03-03 PROCEDURE — 88173 CYTOPATH EVAL FNA REPORT: CPT | Performed by: INTERNAL MEDICINE

## 2022-03-03 PROCEDURE — 82962 GLUCOSE BLOOD TEST: CPT

## 2022-03-03 PROCEDURE — 88312 SPECIAL STAINS GROUP 1: CPT | Performed by: INTERNAL MEDICINE

## 2022-03-03 PROCEDURE — 88305 TISSUE EXAM BY PATHOLOGIST: CPT | Performed by: INTERNAL MEDICINE

## 2022-03-03 PROCEDURE — 25010000002 PROPOFOL 10 MG/ML EMULSION: Performed by: NURSE ANESTHETIST, CERTIFIED REGISTERED

## 2022-03-03 PROCEDURE — 76000 FLUOROSCOPY <1 HR PHYS/QHP: CPT

## 2022-03-03 PROCEDURE — 25010000002 ONDANSETRON PER 1 MG: Performed by: NURSE ANESTHETIST, CERTIFIED REGISTERED

## 2022-03-03 PROCEDURE — 31624 DX BRONCHOSCOPE/LAVAGE: CPT | Performed by: INTERNAL MEDICINE

## 2022-03-03 PROCEDURE — C1726 CATH, BAL DIL, NON-VASCULAR: HCPCS | Performed by: INTERNAL MEDICINE

## 2022-03-03 PROCEDURE — 31627 NAVIGATIONAL BRONCHOSCOPY: CPT | Performed by: INTERNAL MEDICINE

## 2022-03-03 PROCEDURE — 87070 CULTURE OTHR SPECIMN AEROBIC: CPT | Performed by: INTERNAL MEDICINE

## 2022-03-03 PROCEDURE — 31629 BRONCHOSCOPY/NEEDLE BX EACH: CPT | Performed by: INTERNAL MEDICINE

## 2022-03-03 RX ORDER — LIDOCAINE HYDROCHLORIDE 40 MG/ML
4 INJECTION, SOLUTION RETROBULBAR; TOPICAL ONCE
Status: DISCONTINUED | OUTPATIENT
Start: 2022-03-03 | End: 2022-03-03 | Stop reason: HOSPADM

## 2022-03-03 RX ORDER — ONDANSETRON 2 MG/ML
INJECTION INTRAMUSCULAR; INTRAVENOUS AS NEEDED
Status: DISCONTINUED | OUTPATIENT
Start: 2022-03-03 | End: 2022-03-03 | Stop reason: SURG

## 2022-03-03 RX ORDER — SODIUM CHLORIDE, SODIUM LACTATE, POTASSIUM CHLORIDE, CALCIUM CHLORIDE 600; 310; 30; 20 MG/100ML; MG/100ML; MG/100ML; MG/100ML
9 INJECTION, SOLUTION INTRAVENOUS CONTINUOUS
Status: DISCONTINUED | OUTPATIENT
Start: 2022-03-03 | End: 2022-03-03 | Stop reason: HOSPADM

## 2022-03-03 RX ORDER — SODIUM CHLORIDE 0.9 % (FLUSH) 0.9 %
3-10 SYRINGE (ML) INJECTION AS NEEDED
Status: DISCONTINUED | OUTPATIENT
Start: 2022-03-03 | End: 2022-03-03 | Stop reason: HOSPADM

## 2022-03-03 RX ORDER — NALOXONE HCL 0.4 MG/ML
0.4 VIAL (ML) INJECTION AS NEEDED
Status: DISCONTINUED | OUTPATIENT
Start: 2022-03-03 | End: 2022-03-03 | Stop reason: HOSPADM

## 2022-03-03 RX ORDER — SODIUM CHLORIDE 0.9 % (FLUSH) 0.9 %
3 SYRINGE (ML) INJECTION EVERY 12 HOURS SCHEDULED
Status: DISCONTINUED | OUTPATIENT
Start: 2022-03-03 | End: 2022-03-03 | Stop reason: HOSPADM

## 2022-03-03 RX ORDER — LIDOCAINE HYDROCHLORIDE 10 MG/ML
INJECTION, SOLUTION EPIDURAL; INFILTRATION; INTRACAUDAL; PERINEURAL AS NEEDED
Status: DISCONTINUED | OUTPATIENT
Start: 2022-03-03 | End: 2022-03-03 | Stop reason: SURG

## 2022-03-03 RX ORDER — IPRATROPIUM BROMIDE AND ALBUTEROL SULFATE 2.5; .5 MG/3ML; MG/3ML
3 SOLUTION RESPIRATORY (INHALATION) ONCE AS NEEDED
Status: DISCONTINUED | OUTPATIENT
Start: 2022-03-03 | End: 2022-03-03 | Stop reason: HOSPADM

## 2022-03-03 RX ORDER — LABETALOL HYDROCHLORIDE 5 MG/ML
5 INJECTION, SOLUTION INTRAVENOUS
Status: DISCONTINUED | OUTPATIENT
Start: 2022-03-03 | End: 2022-03-03 | Stop reason: HOSPADM

## 2022-03-03 RX ORDER — EPHEDRINE SULFATE 50 MG/ML
INJECTION, SOLUTION INTRAVENOUS AS NEEDED
Status: DISCONTINUED | OUTPATIENT
Start: 2022-03-03 | End: 2022-03-03 | Stop reason: SURG

## 2022-03-03 RX ORDER — SODIUM CHLORIDE 0.9 % (FLUSH) 0.9 %
10 SYRINGE (ML) INJECTION AS NEEDED
Status: DISCONTINUED | OUTPATIENT
Start: 2022-03-03 | End: 2022-03-03 | Stop reason: HOSPADM

## 2022-03-03 RX ORDER — ASPIRIN 81 MG/1
81 TABLET, CHEWABLE ORAL DAILY
COMMUNITY

## 2022-03-03 RX ORDER — PROPOFOL 10 MG/ML
VIAL (ML) INTRAVENOUS AS NEEDED
Status: DISCONTINUED | OUTPATIENT
Start: 2022-03-03 | End: 2022-03-03 | Stop reason: SURG

## 2022-03-03 RX ORDER — ESMOLOL HYDROCHLORIDE 10 MG/ML
INJECTION INTRAVENOUS AS NEEDED
Status: DISCONTINUED | OUTPATIENT
Start: 2022-03-03 | End: 2022-03-03 | Stop reason: SURG

## 2022-03-03 RX ORDER — ONDANSETRON 2 MG/ML
4 INJECTION INTRAMUSCULAR; INTRAVENOUS ONCE AS NEEDED
Status: DISCONTINUED | OUTPATIENT
Start: 2022-03-03 | End: 2022-03-03 | Stop reason: HOSPADM

## 2022-03-03 RX ORDER — HYDRALAZINE HYDROCHLORIDE 20 MG/ML
5 INJECTION INTRAMUSCULAR; INTRAVENOUS
Status: DISCONTINUED | OUTPATIENT
Start: 2022-03-03 | End: 2022-03-03 | Stop reason: HOSPADM

## 2022-03-03 RX ORDER — 0.9 % SODIUM CHLORIDE 0.9 %
10 VIAL (ML) INJECTION ONCE
Status: COMPLETED | OUTPATIENT
Start: 2022-03-03 | End: 2022-03-03

## 2022-03-03 RX ORDER — ROCURONIUM BROMIDE 10 MG/ML
INJECTION, SOLUTION INTRAVENOUS AS NEEDED
Status: DISCONTINUED | OUTPATIENT
Start: 2022-03-03 | End: 2022-03-03 | Stop reason: SURG

## 2022-03-03 RX ADMIN — ONDANSETRON 4 MG: 2 INJECTION INTRAMUSCULAR; INTRAVENOUS at 12:30

## 2022-03-03 RX ADMIN — SUGAMMADEX 200 MG: 100 INJECTION, SOLUTION INTRAVENOUS at 12:32

## 2022-03-03 RX ADMIN — LIDOCAINE HYDROCHLORIDE 80 MG: 10 INJECTION, SOLUTION EPIDURAL; INFILTRATION; INTRACAUDAL; PERINEURAL at 11:31

## 2022-03-03 RX ADMIN — PROPOFOL 25 MCG/KG/MIN: 10 INJECTION, EMULSION INTRAVENOUS at 11:35

## 2022-03-03 RX ADMIN — ROCURONIUM BROMIDE 30 MG: 10 INJECTION, SOLUTION INTRAVENOUS at 11:31

## 2022-03-03 RX ADMIN — PROPOFOL 200 MG: 10 INJECTION, EMULSION INTRAVENOUS at 11:31

## 2022-03-03 RX ADMIN — SODIUM CHLORIDE, POTASSIUM CHLORIDE, SODIUM LACTATE AND CALCIUM CHLORIDE: 600; 310; 30; 20 INJECTION, SOLUTION INTRAVENOUS at 12:39

## 2022-03-03 RX ADMIN — ESMOLOL HYDROCHLORIDE 30 MG: 10 INJECTION, SOLUTION INTRAVENOUS at 11:55

## 2022-03-03 RX ADMIN — SODIUM CHLORIDE, POTASSIUM CHLORIDE, SODIUM LACTATE AND CALCIUM CHLORIDE 9 ML/HR: 600; 310; 30; 20 INJECTION, SOLUTION INTRAVENOUS at 10:01

## 2022-03-03 RX ADMIN — ESMOLOL HYDROCHLORIDE 30 MG: 10 INJECTION, SOLUTION INTRAVENOUS at 11:31

## 2022-03-03 RX ADMIN — EPHEDRINE SULFATE 10 MG: 50 INJECTION, SOLUTION INTRAVENOUS at 12:06

## 2022-03-03 RX ADMIN — Medication 10 ML: at 10:01

## 2022-03-03 NOTE — ANESTHESIA PREPROCEDURE EVALUATION
Anesthesia Evaluation     Patient summary reviewed and Nursing notes reviewed   NPO Solid Status: > 8 hours  NPO Liquid Status: > 8 hours           Airway   Mallampati: II  TM distance: >3 FB  Neck ROM: full  No difficulty expected  Dental      Pulmonary    (+) sleep apnea (No Rx),   (-) asthma, shortness of breath, recent URI, not a smoker, no home oxygen    ROS comment: RUL Nodule  Cardiovascular     ECG reviewed    (+) hypertension,   (-) past MI, dysrhythmias, angina, cardiac stents    ROS comment: ECG NSR  Holter (per pt) neg     Neuro/Psych  (-) seizures, CVA  GI/Hepatic/Renal/Endo    (+)  GERD,  diabetes mellitus type 2, thyroid problem hypothyroidism  (-) liver disease, no renal disease    Musculoskeletal     Abdominal    Substance History      OB/GYN          Other   arthritis,                      Anesthesia Plan    ASA 3     general   (Propofol infusion as part of an anti PONV technique)  intravenous induction     Anesthetic plan, all risks, benefits, and alternatives have been provided, discussed and informed consent has been obtained with: patient.    Plan discussed with CRNA.        CODE STATUS:

## 2022-03-03 NOTE — ANESTHESIA POSTPROCEDURE EVALUATION
Patient: Tati Rapp    Procedure Summary     Date: 03/03/22 Room / Location:  [facility]    Anesthesia Start: 1126 Anesthesia Stop:     Procedure: BRONCHOSCOPY WITH ENDOBRONCHIAL ULTRASOUND AND NAVIGATION (N/A Bronchus) Diagnosis:       Lung nodule      (Lung nodule [R91.1])    Surgeons: Morris Edwards DO Provider: Wu Moss MD    Anesthesia Type: general ASA Status: 3          Anesthesia Type: general    Vitals  Vitals Value Taken Time   BP     Temp     Pulse 96 03/03/22 1245   Resp     SpO2 98 % 03/03/22 1245   Vitals shown include unvalidated device data.  BP: 119/56  HR: 96  RR: 16  Temp: 97.1 F      Post Anesthesia Care and Evaluation    Patient location during evaluation: PACU  Patient participation: complete - patient participated  Level of consciousness: awake and alert  Pain score: 0  Pain management: adequate  Airway patency: patent  Anesthetic complications: No anesthetic complications  PONV Status: none  Cardiovascular status: hemodynamically stable and acceptable  Respiratory status: nonlabored ventilation, acceptable and nasal cannula  Hydration status: acceptable

## 2022-03-03 NOTE — ANESTHESIA PROCEDURE NOTES
Airway  Urgency: elective    Date/Time: 3/3/2022 11:33 AM  Airway not difficult    General Information and Staff    Patient location during procedure: OR  CRNA: Chica Mathis CRNA    Indications and Patient Condition  Indications for airway management: airway protection    Preoxygenated: yes  MILS maintained throughout  Mask difficulty assessment: 2 - vent by mask + OA or adjuvant +/- NMBA    Final Airway Details  Final airway type: endotracheal airway      Successful airway: ETT    Successful intubation technique: direct laryngoscopy  Facilitating devices/methods: intubating stylet  Endotracheal tube insertion site: oral  Blade: Bj  Blade size: 4  ETT size (mm): 8.5  Cormack-Lehane Classification: grade I - full view of glottis  Placement verified by: chest auscultation and capnometry   Measured from: lips  ETT/EBT  to lips (cm): 21  Number of attempts at approach: 1  Assessment: lips, teeth, and gum same as pre-op and atraumatic intubation    Additional Comments  Negative epigastric sounds, symmetrical chest rise and fall, dentition unchanged

## 2022-03-04 LAB
CYTO UR: NORMAL
LAB AP CASE REPORT: NORMAL
LAB AP CLINICAL INFORMATION: NORMAL
PATH REPORT.FINAL DX SPEC: NORMAL
PATH REPORT.GROSS SPEC: NORMAL

## 2022-03-05 LAB
BACTERIA SPEC RESP CULT: NO GROWTH
GRAM STN SPEC: NORMAL

## 2022-03-07 DIAGNOSIS — R91.1 LUNG NODULE: Primary | ICD-10-CM

## 2022-03-07 LAB
LAB AP CASE REPORT: NORMAL
PATH REPORT.FINAL DX SPEC: NORMAL

## 2022-03-07 NOTE — PROGRESS NOTES
I called the patient and informed her that I reviewed the pathology reports.  There was no signs of malignancy on any of the biopsy tissue.  Procedure to go very well and we were within very close proximity less than 1 cm of the nodule.  Therefore I think we should go ahead and continue to follow this with CT scans.  I will plan to have a repeat CT scan in roughly in 3 months from now and then follow-up for a total of 2 years to ensure that there is no growth.  If there is growth at some point time she still may require a resection although we will make that determination at that time.  Unfortunately patient not answer her phone and therefore I did leave her message.  Patient does not need to keep her appointment on 3/22/2022 this can be pushed back to 3 months.    Electronically signed by Morris Edwards DO, 03/07/22, 1:29 PM EST.

## 2022-06-13 ENCOUNTER — HOSPITAL ENCOUNTER (OUTPATIENT)
Age: 77
End: 2022-06-13
Payer: MEDICARE

## 2022-06-13 DIAGNOSIS — R91.1: Primary | ICD-10-CM

## 2022-06-13 PROCEDURE — 71250 CT THORAX DX C-: CPT

## 2022-06-14 DIAGNOSIS — R91.1 LUNG NODULE: ICD-10-CM

## 2022-06-15 ENCOUNTER — OFFICE VISIT (OUTPATIENT)
Dept: PULMONOLOGY | Facility: CLINIC | Age: 77
End: 2022-06-15

## 2022-06-15 VITALS
SYSTOLIC BLOOD PRESSURE: 142 MMHG | DIASTOLIC BLOOD PRESSURE: 78 MMHG | WEIGHT: 171.4 LBS | OXYGEN SATURATION: 96 % | HEIGHT: 64 IN | TEMPERATURE: 96.9 F | BODY MASS INDEX: 29.26 KG/M2 | HEART RATE: 90 BPM

## 2022-06-15 DIAGNOSIS — R91.1 LUNG NODULE: Primary | ICD-10-CM

## 2022-06-15 PROCEDURE — 99213 OFFICE O/P EST LOW 20 MIN: CPT | Performed by: INTERNAL MEDICINE

## 2022-06-15 NOTE — PROGRESS NOTES
"Follow Up Office Note       Patient Name: Tati Rapp    Referring Physician: No ref. provider found    Chief Complaint:    Chief Complaint   Patient presents with   • Post CT     3 mo f/u       History of Present Illness: Tati Rapp is a 77 y.o. female who is here today to follow-up care with Pulmonary.  Patient has a past medical history sniffing for diabetes, hyperparathyroidism, allergic rhinitis, and hyperlipidemia.  Patient doing well currently.  Denies chest pain, nausea, fever, chills.  She has no other acute complaints.  Here today for follow-up    Review of Systems:   Review of Systems   Constitutional: Negative for chills, fatigue and fever.   HENT: Negative for congestion and voice change.    Eyes: Negative for blurred vision.   Respiratory: Negative for cough, shortness of breath and wheezing.    Cardiovascular: Negative for chest pain.   Skin: Negative for dry skin.   Hematological: Negative for adenopathy.   Psychiatric/Behavioral: Negative for agitation and depressed mood.       The following portions of the patient's history were reviewed and updated as appropriate: allergies, current medications, past family history, past medical history, past social history, past surgical history and problem list.    Physical Exam:  Vital Signs:   Vitals:    06/15/22 1312   BP: 142/78   BP Location: Right arm   Patient Position: Sitting   Cuff Size: Adult   Pulse: 90   Temp: 96.9 °F (36.1 °C)   TempSrc: Infrared   SpO2: 96%  Comment: room air, at rest   Weight: 77.7 kg (171 lb 6.4 oz)   Height: 163.8 cm (64.49\")       Physical Exam  Vitals and nursing note reviewed.   Constitutional:       General: She is not in acute distress.     Appearance: She is well-developed and normal weight. She is not ill-appearing or toxic-appearing.   HENT:      Head: Normocephalic and atraumatic.   Cardiovascular:      Rate and Rhythm: Normal rate and regular rhythm.      Pulses: Normal pulses.      Heart sounds: Normal " heart sounds. No murmur heard.    No friction rub. No gallop.   Pulmonary:      Effort: Pulmonary effort is normal. No respiratory distress.      Breath sounds: Normal breath sounds. No wheezing, rhonchi or rales.   Musculoskeletal:      Right lower leg: No edema.      Left lower leg: No edema.   Skin:     General: Skin is warm and dry.   Neurological:      Mental Status: She is alert and oriented to person, place, and time.         Immunization History   Administered Date(s) Administered   • COVID-19 (MODERNA) 1st, 2nd, 3rd Dose Only 03/31/2021, 05/12/2021   • Fluzone High Dose =>65 Years ([employer] ONLY) 11/19/2018, 11/15/2019   • Influenza, Unspecified 09/25/2017       Results Review:   - I personally reviewed the pts imaging report from CT scan of the chest on 6/13/2022.  It was noted that the patient had a stable 1.2 cm right upper lobe noncalcified nodule   - PET scan 2/9/2022 showed a 1.28 cm right upper lobe noncalcified nodule with a maximum SUV of 2.7  -Personally reviewed the patient's biopsy reports from March 2022, which showed alveolar lung parenchyma, with mild chronic inflammation, and negative for malignancy.  Cytology reports also reviewed showing a station 11 R lymph node was negative for malignancy, right upper lobe FNA was negative for malignancy, and bronchial brushings were negative for malignancy    Assessment / Plan:   1. 1.3 cm right upper lobe noncalcified nodule (2/2022) (Primary)  -Upper lobe nodule is stable at 1.3 cm.  I was able to review the report from the outside hospital.  We will plan to repeat a CT scan and 6 months, and then will like to follow it for 2 years.  This is likely some level of benign disease though.  Patient verbalized understanding of the plan and agreed.      Follow Up:   Return in about 6 months (around 12/15/2022) for CT Chest with next visit.       SAMEERA Edwards,   Pulmonary and Critical Care Medicine  Note Electronically Signed    Part of this note may be  an electronic transcription/translation of spoken language to printed text using the Dragon Dictation System.

## 2022-06-16 DIAGNOSIS — Z00.6 EXAMINATION FOR NORMAL COMPARISON OR CONTROL IN CLINICAL RESEARCH: Primary | ICD-10-CM

## 2022-11-10 ENCOUNTER — HOSPITAL ENCOUNTER (OUTPATIENT)
Dept: HOSPITAL 22 - SL | Age: 77
End: 2022-11-10
Payer: MEDICARE

## 2022-11-10 DIAGNOSIS — G47.09: ICD-10-CM

## 2022-11-10 DIAGNOSIS — F39: ICD-10-CM

## 2022-11-10 DIAGNOSIS — Z86.69: ICD-10-CM

## 2022-11-10 DIAGNOSIS — G47.33: Primary | ICD-10-CM

## 2022-11-10 PROCEDURE — G0399 HOME SLEEP TEST/TYPE 3 PORTA: HCPCS

## 2022-12-13 ENCOUNTER — HOSPITAL ENCOUNTER (OUTPATIENT)
Age: 77
End: 2022-12-13
Payer: MEDICARE

## 2022-12-13 DIAGNOSIS — R91.1: Primary | ICD-10-CM

## 2022-12-13 PROCEDURE — 71250 CT THORAX DX C-: CPT

## 2022-12-14 ENCOUNTER — OFFICE VISIT (OUTPATIENT)
Dept: PULMONOLOGY | Facility: CLINIC | Age: 77
End: 2022-12-14

## 2022-12-14 VITALS
HEART RATE: 99 BPM | SYSTOLIC BLOOD PRESSURE: 142 MMHG | BODY MASS INDEX: 28.58 KG/M2 | TEMPERATURE: 96.5 F | OXYGEN SATURATION: 97 % | WEIGHT: 167.4 LBS | DIASTOLIC BLOOD PRESSURE: 84 MMHG | HEIGHT: 64 IN

## 2022-12-14 DIAGNOSIS — Z00.6 EXAMINATION FOR NORMAL COMPARISON OR CONTROL IN CLINICAL RESEARCH: Primary | ICD-10-CM

## 2022-12-14 DIAGNOSIS — R91.1 LUNG NODULE: Primary | ICD-10-CM

## 2022-12-14 PROCEDURE — 99213 OFFICE O/P EST LOW 20 MIN: CPT | Performed by: INTERNAL MEDICINE

## 2022-12-14 NOTE — PROGRESS NOTES
"Follow Up Office Note       Patient Name: Tati Rapp    Referring Physician: No ref. provider found    Chief Complaint:    Chief Complaint   Patient presents with   • Lung Nodule     F/u        History of Present Illness: Tati Rapp is a 77 y.o. female who is here today to follow-up care with Pulmonary.  Patient has a past medical history sniffing for diabetes, hyperparathyroidism, allergic rhinitis, and hyperlipidemia.  Patient denies any chest pain, nausea, fever, or chills.  No new or acute complaints.    Review of Systems:   Review of Systems   Constitutional: Negative for chills, fatigue and fever.   HENT: Negative for congestion and voice change.    Eyes: Negative for blurred vision.   Respiratory: Negative for cough, shortness of breath and wheezing.    Cardiovascular: Negative for chest pain.   Skin: Negative for dry skin.   Hematological: Negative for adenopathy.   Psychiatric/Behavioral: Negative for agitation and depressed mood.       The following portions of the patient's history were reviewed and updated as appropriate: allergies, current medications, past family history, past medical history, past social history, past surgical history and problem list.    Physical Exam:  Vital Signs:   Vitals:    12/14/22 1443   BP: 142/84   BP Location: Left arm   Patient Position: Sitting   Cuff Size: Adult   Pulse: 99   Temp: 96.5 °F (35.8 °C)   TempSrc: Infrared   SpO2: 97%  Comment: resting room air   Weight: 75.9 kg (167 lb 6.4 oz)   Height: 163.8 cm (64.49\")       Physical Exam  Vitals and nursing note reviewed.   Constitutional:       General: She is not in acute distress.     Appearance: She is well-developed and normal weight. She is not ill-appearing or toxic-appearing.   HENT:      Head: Normocephalic and atraumatic.   Cardiovascular:      Rate and Rhythm: Normal rate and regular rhythm.      Pulses: Normal pulses.      Heart sounds: Normal heart sounds. No murmur heard.    No friction rub. " No gallop.   Pulmonary:      Effort: Pulmonary effort is normal. No respiratory distress.      Breath sounds: Normal breath sounds. No wheezing, rhonchi or rales.   Musculoskeletal:      Right lower leg: No edema.      Left lower leg: No edema.   Skin:     General: Skin is warm and dry.   Neurological:      Mental Status: She is alert and oriented to person, place, and time.         Immunization History   Administered Date(s) Administered   • COVID-19 (MODERNA) 1st, 2nd, 3rd Dose Only 03/31/2021, 05/12/2021   • Fluzone High Dose =>65 Years ([employer] ONLY) 11/19/2018, 11/15/2019   • Influenza, Unspecified 09/25/2017       Results Review:   -I personally viewed the patient's CT scan of the chest from December 2022 which showed that the right upper lobe nodule is potentially slightly enlarged and measuring the original images to now it was closer to 13 mm originally currently measuring closer to 14 mm.   - CT scan of the chest on 6/13/2022.  It was noted that the patient had a stable 1.2 cm right upper lobe noncalcified nodule              - PET scan 2/9/2022 showed a 1.28 cm right upper lobe noncalcified nodule with a maximum SUV of 2.7  - biopsy reports from March 2022, which showed alveolar lung parenchyma, with mild chronic inflammation, and negative for malignancy.  Cytology reports also reviewed showing a station 11 R lymph node was negative for malignancy, right upper lobe FNA was negative for malignancy, and bronchial brushings were negative for malignancy    Assessment / Plan:   Diagnoses and all orders for this visit:    1. 1.3 cm right upper lobe noncalcified nodule (2/2022) (Primary)  -The right upper lobe nodule is overall stable in size.  Given the 1 cm change this could be just imaging technique and does not quite meet the requirements having a 2 mm change to be significant regardless of this is that of a 1 year CT scan I will repeat a CT scan in 6 months.  Still need to follow this for a total of 2 years  to confirm stability.  Patient verbalized understanding and agree with the plan.  I gone ahead and ordered a CT scan of the chest for 6 months.      Follow Up:   Return in about 6 months (around 6/14/2023) for CT Chest with next visit.       SAMEERA Edwards, DO  Pulmonary and Critical Care Medicine  Note Electronically Signed    Part of this note may be an electronic transcription/translation of spoken language to printed text using the Dragon Dictation System.

## 2023-01-07 ENCOUNTER — HOSPITAL ENCOUNTER (OUTPATIENT)
Age: 78
End: 2023-01-07
Payer: MEDICARE

## 2023-01-07 DIAGNOSIS — R30.0: Primary | ICD-10-CM

## 2023-01-07 DIAGNOSIS — B95.2: ICD-10-CM

## 2023-01-07 PROCEDURE — 87186 SC STD MICRODIL/AGAR DIL: CPT

## 2023-01-07 PROCEDURE — 87088 URINE BACTERIA CULTURE: CPT

## 2023-01-07 PROCEDURE — 87086 URINE CULTURE/COLONY COUNT: CPT

## 2023-06-05 ENCOUNTER — HOSPITAL ENCOUNTER (OUTPATIENT)
Dept: HOSPITAL 22 - RAD | Age: 78
End: 2023-06-05
Payer: MEDICARE

## 2023-06-05 DIAGNOSIS — R91.1: Primary | ICD-10-CM

## 2023-06-05 PROCEDURE — 71250 CT THORAX DX C-: CPT

## 2023-06-07 DIAGNOSIS — Z00.6 EXAMINATION FOR NORMAL COMPARISON OR CONTROL IN CLINICAL RESEARCH: Primary | ICD-10-CM

## 2023-06-09 ENCOUNTER — OFFICE VISIT (OUTPATIENT)
Dept: PULMONOLOGY | Facility: CLINIC | Age: 78
End: 2023-06-09
Payer: MEDICARE

## 2023-06-09 VITALS
RESPIRATION RATE: 18 BRPM | HEIGHT: 64 IN | DIASTOLIC BLOOD PRESSURE: 80 MMHG | BODY MASS INDEX: 29.28 KG/M2 | TEMPERATURE: 97.5 F | OXYGEN SATURATION: 96 % | HEART RATE: 83 BPM | WEIGHT: 171.5 LBS | SYSTOLIC BLOOD PRESSURE: 126 MMHG

## 2023-06-09 DIAGNOSIS — R91.1 LUNG NODULE: Primary | ICD-10-CM

## 2023-06-09 RX ORDER — FLUCONAZOLE 150 MG/1
150 TABLET ORAL ONCE
COMMUNITY
Start: 2023-05-23

## 2023-06-09 NOTE — PROGRESS NOTES
"Follow Up Office Note       Patient Name: Tati Rapp    Referring Physician: No ref. provider found    Chief Complaint:    Chief Complaint   Patient presents with    Post CT     F/u       History of Present Illness: Tati Rapp is a 78 y.o. female who is here today to follow-up care with Pulmonary.  Patient has a past medical history sniffing for diabetes, hyperparathyroidism, allergic rhinitis, and hyperlipidemia.  Patient currently doing very well denies chest pain, nausea, fever, or chills.  No new or acute complaints.    Review of Systems:   Review of Systems   Constitutional:  Negative for chills, fatigue and fever.   HENT:  Negative for congestion and voice change.    Eyes:  Negative for blurred vision.   Respiratory:  Negative for cough, shortness of breath and wheezing.    Cardiovascular:  Negative for chest pain.   Skin:  Negative for dry skin.   Hematological:  Negative for adenopathy.   Psychiatric/Behavioral:  Negative for agitation and depressed mood.      The following portions of the patient's history were reviewed and updated as appropriate: allergies, current medications, past family history, past medical history, past social history, past surgical history and problem list.    Physical Exam:  Vital Signs:   Vitals:    06/09/23 1024   BP: 126/80   BP Location: Left arm   Patient Position: Sitting   Cuff Size: Adult   Pulse: 83   Resp: 18   Temp: 97.5 °F (36.4 °C)   SpO2: 96%  Comment: room air at rest   Weight: 77.8 kg (171 lb 8 oz)   Height: 163.8 cm (64.49\")       Physical Exam  Vitals and nursing note reviewed.   Constitutional:       General: She is not in acute distress.     Appearance: She is well-developed and normal weight. She is not ill-appearing or toxic-appearing.   HENT:      Head: Normocephalic and atraumatic.   Cardiovascular:      Rate and Rhythm: Normal rate and regular rhythm.      Pulses: Normal pulses.      Heart sounds: Normal heart sounds. No murmur heard.    No " friction rub. No gallop.   Pulmonary:      Effort: Pulmonary effort is normal. No respiratory distress.      Breath sounds: Normal breath sounds. No wheezing, rhonchi or rales.   Musculoskeletal:      Right lower leg: No edema.      Left lower leg: No edema.   Skin:     General: Skin is warm and dry.   Neurological:      Mental Status: She is alert and oriented to person, place, and time.       Immunization History   Administered Date(s) Administered    COVID-19 (MODERNA) 1st,2nd,3rd Dose Monovalent 03/31/2021, 05/12/2021    Fluzone High Dose =>65 Years ([employer] ONLY) 11/19/2018, 11/15/2019    Influenza, Unspecified 09/25/2017       Results Review:   -I personally viewed the patient's CT scan of the chest from 6/7/2023 which shows the right upper lobe nodule is stable in size at 14 mm.  There is also a 5 mm left upper lobe nodule that has been stable since June 2022.  - CT scan of the chest from December 2022 which showed that the right upper lobe nodule is potentially slightly enlarged and measuring the original images to now it was closer to 13 mm originally currently measuring closer to 14 mm.              - CT scan of the chest on 6/13/2022.  It was noted that the patient had a stable 1.2 cm right upper lobe noncalcified nodule              - PET scan 2/9/2022 showed a 1.28 cm right upper lobe noncalcified nodule with a maximum SUV of 2.7  - biopsy reports from March 2022, which showed alveolar lung parenchyma, with mild chronic inflammation, and negative for malignancy.  Cytology reports also reviewed showing a station 11 R lymph node was negative for malignancy, right upper lobe FNA was negative for malignancy, and bronchial brushings were negative for malignancy    Assessment / Plan:   Diagnoses and all orders for this visit:    1. 1.3 cm right upper lobe noncalcified nodule (2/2022) (Primary)  -CT scan of the chest shows that the nodules currently stable 13 to 14 mm.  No significant change over the course of  the last year.  We will follow-up with a CT scan 1 year from now.  If that is stable then no further imaging is required.    Follow Up:   Return in about 1 year (around 6/9/2024) for CT Chest with next visit.       SAMEERA Edwards, DO  Pulmonary and Critical Care Medicine  Note Electronically Signed    Part of this note may be an electronic transcription/translation of spoken language to printed text using the Dragon Dictation System.

## 2023-06-27 ENCOUNTER — HOSPITAL ENCOUNTER (OUTPATIENT)
Age: 78
End: 2023-06-27
Payer: MEDICARE

## 2023-06-27 DIAGNOSIS — M81.0: ICD-10-CM

## 2023-06-27 DIAGNOSIS — E11.9: ICD-10-CM

## 2023-06-27 DIAGNOSIS — E83.10: Primary | ICD-10-CM

## 2023-06-27 DIAGNOSIS — E78.5: ICD-10-CM

## 2023-06-27 LAB
25-OH VITAMIN D, TOTAL: 41.7 NG/ML (ref 30–100)
ALBUMIN LEVEL: 4 G/DL (ref 3.5–5)
ALBUMIN/GLOB SERPL: 1.3 {RATIO} (ref 1.1–1.8)
ALP ISO SERPL-ACNC: 99 U/L (ref 38–126)
ALT SERPLBLD-CCNC: 37 U/L (ref 12–78)
ANION GAP SERPL CALC-SCNC: 14.4 MEQ/L (ref 5–15)
AST SERPL QL: 36 U/L (ref 14–36)
BILIRUBIN,TOTAL: 0.5 MG/DL (ref 0.2–1.3)
BUN SERPL-MCNC: 19 MG/DL (ref 7–17)
CALCIUM SPEC-MCNC: 8.6 MG/DL (ref 8.4–10.2)
CHLORIDE SPEC-SCNC: 104 MMOL/L (ref 98–107)
CHOLEST SPEC-SCNC: 168 MG/DL (ref 140–200)
CO2 SERPL-SCNC: 27 MMOL/L (ref 22–30)
CREAT BLD-SCNC: 0.7 MG/DL (ref 0.52–1.04)
ESTIMATED GLOMERULAR FILT RATE: 81 ML/MIN (ref 60–?)
FERRITIN SERPL-MCNC: 16.2 NG/ML (ref 11.1–264)
GFR (AFRICAN AMERICAN): 98 ML/MIN (ref 60–?)
GLOBULIN SER CALC-MCNC: 3.1 G/DL (ref 1.3–3.2)
GLUCOSE: 75 MG/DL (ref 74–100)
HBA1C MFR BLD: 5.4 % (ref 4–6)
HCT VFR BLD CALC: 44.4 % (ref 37–47)
HDLC SERPL-MCNC: 54 MG/DL (ref 40–60)
HGB BLD-MCNC: 14.7 G/DL (ref 12.2–16.2)
MCHC RBC-ENTMCNC: 33.1 G/DL (ref 31.8–35.4)
MCV RBC: 85.3 FL (ref 81–99)
MEAN CORPUSCULAR HEMOGLOBIN: 28.2 PG (ref 27–31.2)
PLATELET # BLD: 226 K/MM3 (ref 142–424)
POTASSIUM: 4.4 MMOL/L (ref 3.5–5.1)
PROT SERPL-MCNC: 7.1 G/DL (ref 6.3–8.2)
RBC # BLD AUTO: 5.21 M/MM3 (ref 4.2–5.4)
SODIUM SPEC-SCNC: 141 MMOL/L (ref 136–145)
TRIGLYCERIDES: 218 MG/DL (ref 30–150)
TSH SERPL-ACNC: 0.54 UIU/ML (ref 0.47–4.68)
WBC # BLD AUTO: 5.9 K/MM3 (ref 4.8–10.8)

## 2023-06-27 PROCEDURE — 83036 HEMOGLOBIN GLYCOSYLATED A1C: CPT

## 2023-06-27 PROCEDURE — 82306 VITAMIN D 25 HYDROXY: CPT

## 2023-06-27 PROCEDURE — 36415 COLL VENOUS BLD VENIPUNCTURE: CPT

## 2023-06-27 PROCEDURE — 84443 ASSAY THYROID STIM HORMONE: CPT

## 2023-06-27 PROCEDURE — 85025 COMPLETE CBC W/AUTO DIFF WBC: CPT

## 2023-06-27 PROCEDURE — 82728 ASSAY OF FERRITIN: CPT

## 2023-06-27 PROCEDURE — 80061 LIPID PANEL: CPT

## 2023-06-27 PROCEDURE — 80053 COMPREHEN METABOLIC PANEL: CPT

## 2023-10-19 ENCOUNTER — HOSPITAL ENCOUNTER (OUTPATIENT)
Age: 78
End: 2023-10-19
Payer: MEDICARE

## 2023-10-19 DIAGNOSIS — M25.551: ICD-10-CM

## 2023-10-19 DIAGNOSIS — M54.50: Primary | ICD-10-CM

## 2023-10-19 DIAGNOSIS — M25.552: ICD-10-CM

## 2023-10-19 PROCEDURE — 73502 X-RAY EXAM HIP UNI 2-3 VIEWS: CPT

## 2023-10-19 PROCEDURE — 72110 X-RAY EXAM L-2 SPINE 4/>VWS: CPT

## 2023-11-15 NOTE — [facility].PTOPEV
PT Outpatient Evaluation

Rehab PT Outpatient Evaluation                             Start:  11/15/23 12:59
Freq:                                                      Status: Active        
Protocol:                                                                        
 Document     11/15/23 12:59  RUTHBASILIO  (Rec: 11/15/23 16:00  ANNA  [id number])
 E-signed By  Aminata Prabhakar, PT
 Outpatient Therapy Subjective History
     Subjective
      History                                    Pt is a 79 y/o female who
                                                 reports onset of left
                                                 posterior hip pain after a
                                                 fall at the end of August. Pt
                                                 reports she tripped over a
                                                 sack of garbage and landed on
                                                 her right elbow and right leg
                                                 and she hurt her left hip
                                                 somehow during the fall. Pt
                                                 reports pain has improved
                                                 since the fall. Pt reports
                                                 pain often refers into the
                                                 back of her leg to the knee,
                                                 pt denies distal LE symptoms
                                                 or paresthesia. Pt reports she
                                                 iced the hip for a few weeks
                                                 then had xrays of her hips and
                                                 low back with findings of
                                                 arthritis, pt denies known
                                                 fractures. Pt reports she has
                                                 been avoiding aggravating
                                                 activities since the injury
                                                 such as sitting flat on the
                                                 left hip, prolonged walking,
                                                 and stairs. Pt reports she
                                                 takes Tyleonol as needed which
                                                 does help improve pain. Pt
                                                 reports she does have
                                                 restrictions for no heavy
                                                 lifting since birthing a 10#
                                                 baby vaginally 46 years ago
                                                 and having surgery after.
                                                 Medical History: Vertigo,
                                                 Fibromyalgia, Type II Diabetes
                                                 controlled with diet, High
                                                 cholesterol, Hypertension,
                                                 PTSD, Anxiety, Insomnia
      New diagnosis of cancer in past 12         No
       months?                                   
      Chief Complaint                            Pain
      Symptom Type                               Ache,Dull
      Symptoms Relieved By                       Rest/Positioning,OTC Meds
      Symptoms Aggravated By                     Standing,Bending/Stooping,
                                                 Physical Activity,Twisting,
                                                 Walking,Lifting
      Prior Functional Limitations               None
      Current Functional Limitations             Lifting,Housework,Standing,
                                                 Squatting,Walking,Stairs,
                                                 Bending/Stooping
      Symptom Description                        Constant but Variable
      Level of pain today (0-10)                 5
      Pain scale - at its best (0-10)            0
      Pain scale - at its worst (0-10)           5
 Lumbopelvic Eval
     Palapation tenderness
      bilateral
       buttock tenderness                        Yes: piriformis, glute med/min
                                                 , greater trochanter
       Lumbar/Sacral Palpation Findings          Tenderness
     Range of Motion
      Lumbar Spine Active Flexion Range of       65
       Motion (degrees)                          
      Lumbar Spine Active Extension Range of     20
       Motion (degrees)                          
      Left Lumbar Spine Lateral Flexion Active   20
       Range of Motion (degrees)                 
      Right Lumbar Spine Lateral Flexion         20
       Active Range of Motion (degrees)          
     Manual Muscle Test
      Bilateral
       Knee Extension Strength Grade             5    Normal
       Knee Flexion Strength Grade               5    Normal
       Hip Flexion Strength Grade                5    Normal
       Hip Abduction Strength Grade              4    Good
       Hip Extension Strength Grade              4-   Good-
       Ankle Dorsiflexion Strength Grade         5    Normal
     DTR
      Rt Patellar                                3+
      Lt Patellar                                3+
      Rt Gastroc/Soleus                          2+
      Lt Gastroc/Soleus                          2+
     Altered Sensation
      Bilateral
       Comment                                   equal and intact to light
                                                 touch sensation bilaterally
     Special Tests
      Hip Scouring (Quadrant) Test               Negative Left
      Hip Gokul (MAÍRA) Test                   Negative Left
      Hip Piriformis Test                        Negative Left
      Sciatic Nerve Tension Test                 Negative Left
      Unilateral Straight Leg Raise (Lasegue)    Negative Left
       Test                                      
 Hip/Knee Eval
     ROM
      bilateral
       Hip External Rotation Active Range of     40
        Motion (degrees)                         
       Hip Internal Rotation Active Range of     40
        Motion (degrees)                         
 Lower Extremity Functional Index
     Activities
      Today, do you or would you have any difficulty at all with:
       a.Any of your usual work, housework or    Moderate difficulty
        school activities                        
       b. Your usual hobbies, recreational or    Moderate difficulty
        sporting activities                      
       c. Getting into or out of the bath        Moderate difficulty
       d. Walking between rooms                  Moderate difficulty
       e. Putting on your shoes or socks         Quite a bit of difficulty
       f. Squatting                              Quite a bit of difficulty
       g. Lifting an object, like a bag of       Moderate difficulty
        groceries from the floor                 
       h. Performing light activities around     Moderate difficulty
        your home                                
       i. Performing heavy activities around     Quite a bit of difficulty
        your home                                
       j. Getting into or out of a car           A little bit of difficulty
       k. Walking 2 blocks                       Quite a bit of difficulty
       l. Walking a mile                         Extreme difficulty or unable
                                                 to perform activity
       m. Going up or down 10 stairs (about 1    Extreme difficulty or unable
        flight of stairs)                        to perform activity
       n. Standing for 1 hour                    Extreme difficulty or unable
                                                 to perform activity
       o. Sitting for 1 hour                     A little bit of difficulty
       p. Running on even ground                 Extreme difficulty or unable
                                                 to perform activity
       q. Running on uneven ground               Extreme difficulty or unable
                                                 to perform activity
       r. Making sharp turns while running fast  Extreme difficulty or unable
                                                 to perform activity
       s. Hopping                                Extreme difficulty or unable
                                                 to perform activity
       t. Rolling over in bed                    No difficulty
     LEFI Score
       Lower Extremity Functional Index Score    26
 Outpatient Therapy Assessment
     Impairments
       Problems/Impairmments                     Palpation Tenderness,Impaired
                                                 Range of Motion,Impaired
                                                 Strength,Impaired Walking,
                                                 Impaired Standing,Impaired
                                                 Sitting,Impaired Lifting,
                                                 Impaired Household Care,
                                                 Impaired Stair Climbing,
                                                 Impaired Squatting,Impaired
                                                 Bending,Subjective C/O Pain,
                                                 Impaired Self Care/Self
                                                 Management
     Prognosis
       Rehab Potential                           Good
     Clinical Impression
       Consistent with Diagnosis                 Yes
     Short Term Goals
       Number of Weeks                           3
       Improve LEFI Score                        Yes: Improve score to 36/80 to
                                                 improve overall QOL
       Improve Self Care/Self Management         Yes
       Patient to be Ind w/ HEP                  Yes
     Long Term Goals
       Number of Weeks                           6
       Increase Range of Motion                  Yes: Improve lumbar flex AROM
                                                 to 80-90 degrees
       Increase Strength                         Yes: Improve LE MMT to 4+-5/5
                                                 grossly to assist with
                                                 function
       Increase Ability to Walk                  Yes: 10' with pain 2-3/10 or
                                                 less to assist with ADLs
       Improve Ability to Climb Stairs           Yes: 1 flight with 1 HR
                                                 reciprocally with pain 2-3/10
                                                 or less
       Improve LEFI Score                        Yes: Improve score to at least
                                                 50-60/80 to improve overall
                                                 QOL
       Decrease Subjective C/O Pain              Yes: Improve pain severity to
                                                 2-3/10 to improve overal QOL
       Patient to be Ind w/ Advanced HEP         Yes
 Outpatient Therapy Plan of Care
     Treatment Plan May Include
       Therapeutic Exercise Including Home       Yes
        Exercise Program                         
       Manual Therapy Techniques                 Yes
       Neuromuscular Re-education                Yes
       Therapeutic Activities to Return to       Yes
        Previous Functional/Work Level           
       ADL/Self Care Education                   Yes
       Mechanical Traction                       Yes
       Dry Needling                              Yes
       Thermal Modalities                        Yes
       Electrical Stimulation                    Yes
       Ultrasound/Phonophoresis                  Yes
       Iontophoresis                             Yes
       Massage                                   Yes
       Group Therapy for Medicare                Yes
       Eval/Re-Eval                              Yes
     Frequency
       Times per week                            2
     Duration
       Number of Weeks                           4-6
     Addendums
       This patient is a candidate for social    No
        or vocational rehab?                     
       Patient/Guardian verbally acknowledges    Yes
        understanding of treatment program and   
        consents to further treatment?           
       Patient/Guardian verbally acknowledges    Yes
        understanding of diagnosis, prognosis    
        and goals for treatment?                 
     Eval Complexity
       PT Charges                                28312 - Low Complexity
 Shoulder/Elbow Eval
     Shoulder Objective Measurements
     Elbow Objective Measurements



______________________________________________________________________
PHYSICIAN CERTIFICATION:

  I certify the specified therapy services for Ruthie Keith are required, authorized, 
and reviewed every 30 days.
______________________________________________________________________

## 2023-12-01 ENCOUNTER — HOSPITAL ENCOUNTER (OUTPATIENT)
Dept: HOSPITAL 22 - PT | Age: 78
Discharge: HOME | End: 2023-12-01
Payer: MEDICARE

## 2023-12-01 DIAGNOSIS — M19.90: ICD-10-CM

## 2023-12-01 DIAGNOSIS — M25.552: Primary | ICD-10-CM

## 2023-12-01 PROCEDURE — 97163 PT EVAL HIGH COMPLEX 45 MIN: CPT

## 2023-12-01 PROCEDURE — 97014 ELECTRIC STIMULATION THERAPY: CPT

## 2023-12-01 PROCEDURE — 97010 HOT OR COLD PACKS THERAPY: CPT

## 2023-12-01 PROCEDURE — G0283 ELEC STIM OTHER THAN WOUND: HCPCS

## 2023-12-01 PROCEDURE — 97110 THERAPEUTIC EXERCISES: CPT

## 2024-03-14 ENCOUNTER — HOSPITAL ENCOUNTER (OUTPATIENT)
Age: 79
End: 2024-03-14
Payer: MEDICARE

## 2024-03-14 DIAGNOSIS — E61.1: Primary | ICD-10-CM

## 2024-03-14 LAB — FERRITIN SERPL-MCNC: 26.1 NG/ML (ref 11.1–264)

## 2024-03-14 PROCEDURE — 82728 ASSAY OF FERRITIN: CPT

## 2024-03-14 PROCEDURE — 36415 COLL VENOUS BLD VENIPUNCTURE: CPT

## 2024-06-06 ENCOUNTER — HOSPITAL ENCOUNTER (OUTPATIENT)
Dept: HOSPITAL 22 - RAD | Age: 79
Discharge: HOME | End: 2024-06-06
Payer: MEDICARE

## 2024-06-06 DIAGNOSIS — R91.1: Primary | ICD-10-CM

## 2024-06-06 PROCEDURE — 71250 CT THORAX DX C-: CPT

## 2024-06-06 NOTE — CT_ITS
FINAL REPORT 
 
TECHNIQUE: 
Axial CT images were performed from the lung apices through the 
upper abdomen. Coronal and sagittal reformats were submitted. 
This study was performed with techniques to keep radiation doses 
as low as reasonably achievable (ALARA). Individualized dose 
reduction techniques using automated exposure control or 
adjustment of mA and/or kV according to the patient's size were 
employed. 
 
CLINICAL HISTORY: 
LUNG NODULE 
 
COMPARISON: 
None 
 
FINDINGS: 
There is no axillary adenopathy. There is no hilar or 
mediastinal mass or adenopathy. Heart size is normal. There is 
no pericardial or pleural effusion.   There is a 5 mm posterior 
left upper lobe nodule seen on image #27, stable.  There is a 14 
mm noncalcified nodule seen on image #41, which has been stable 
since 2023.  Mild scarring remains present.  No new nodules or 
masses are identified.  The gallbladder has been surgically 
resected.  There are several bilateral less than 3 mm renal 
stones present, nonobstructing, stable.  There are 
low-attenuation hepatic masses and a low-attenuation focus in 
the upper pole of the right kidney that are also stable in 
appearance when compared to the prior CT. 
 
IMPRESSION: 
Stable 5 mm posterior left upper lobe nodule.  14 mm 
noncalcified nodule is also stable since 2023.  Recommend 
12-month follow-up CT chest to follow.  Several bilateral less 
than 3 mm nonobstructing renal stones are present, stable. 
 
Reviewed, Interpreted and Dictated by Luis Auguste III, MD 
Transcribed by Donna Linder 
 
Authenticated and Electronically Signed by Luis Auguste III, MD on 06/07/2024 08:46:39 AM [facility]

## 2024-06-07 DIAGNOSIS — R91.1 LUNG NODULE: ICD-10-CM

## 2024-09-06 NOTE — PROGRESS NOTES
GYN Annual Exam     CC - Here for annual exam.        HPI  Tati Rapp is a 76 y.o. female, , who presents for annual well woman exam.  She is menopausal}.  Patient reports problems with: cystocele and rectocele. There were no changes to her medical or surgical history since her last visit.. Partner Status: Marital Status: .  New Partners since last visit: no.  Pt is here for rectocele and cystocele.  Pt. Sates this is very painful.  Pt. Was just treated for Uti and will do ccua today.     Additional OB/GYN History   Current contraception: contraceptive methods: None  Desires to: do not start contraception  On HRT? No  Last Pap :   Last Completed Pap Smear    Patient has no health maintenance due at this time       History of abnormal Pap smear: no  Family history of uterine, colon, breast, or ovarian cancer: no  Performs monthly Self-Breast Exam: yes  Last mammogram:   normal  Last Completed Mammogram    Patient has no health maintenance due at this time       Last colonoscopy:  normal    Last DEXA: {Dexa Date and Results:3831  Exercises Regularly: {  Yes    Feelings of Anxiety or Depression: yes - anixous      Tobacco Usage?: No   OB History        2    Para   2    Term   2            AB        Living           SAB        TAB        Ectopic        Molar        Multiple        Live Births                    Health Maintenance   Topic Date Due   • DXA SCAN  Never done   • COLONOSCOPY  Never done   • COVID-19 Vaccine (1) Never done   • TDAP/TD VACCINES (1 - Tdap) Never done   • ZOSTER VACCINE (1 of 2) Never done   • Pneumococcal Vaccine 65+ (1 of 1 - PPSV23) Never done   • HEPATITIS C SCREENING  Never done   • ANNUAL WELLNESS VISIT  Never done   • INFLUENZA VACCINE  2020   • MENINGOCOCCAL VACCINE  Aged Out       The additional following portions of the patient's history were reviewed and updated as appropriate: allergies, current medications, past family history, past  "medical history, past social history, past surgical history and problem list.    Review of Systems   Constitutional: Positive for appetite change, fatigue and unexpected weight gain.   HENT: Positive for ear pain, sinus pressure and sneezing.    Eyes: Positive for itching.   Respiratory: Positive for apnea, cough and chest tightness.    Cardiovascular: Positive for chest pain and palpitations.   Gastrointestinal: Positive for abdominal distention and constipation.   Endocrine: Positive for cold intolerance and heat intolerance.   Genitourinary: Positive for breast lump, breast pain and pelvic pain.   Musculoskeletal: Positive for back pain and myalgias.     All other systems reviewed and are negative.     I have reviewed and agree with the HPI, ROS, and historical information as entered above. Antwan Escobar MD    Objective   /82   Temp 97.5 °F (36.4 °C)   Ht 165.1 cm (65\")   Wt 75.8 kg (167 lb)   BMI 27.79 kg/m²     Physical Exam  Vitals and nursing note reviewed. Exam conducted with a chaperone present.   Constitutional:       Appearance: She is well-developed.   HENT:      Head: Normocephalic and atraumatic.   Neck:      Thyroid: No thyroid mass or thyromegaly.   Cardiovascular:      Rate and Rhythm: Normal rate and regular rhythm.      Heart sounds: No murmur heard.     Pulmonary:      Effort: Pulmonary effort is normal. No retractions.      Breath sounds: Normal breath sounds. No wheezing, rhonchi or rales.   Chest:      Chest wall: No mass or tenderness.      Breasts:         Right: Normal. No mass, nipple discharge, skin change or tenderness.         Left: Normal. No mass, nipple discharge, skin change or tenderness.   Abdominal:      General: Bowel sounds are normal.      Palpations: Abdomen is soft. Abdomen is not rigid. There is no mass.      Tenderness: There is no abdominal tenderness. There is no guarding.      Hernia: No hernia is present. There is no hernia in the left inguinal area. "   Genitourinary:     Labia:         Right: No rash, tenderness or lesion.         Left: No rash, tenderness or lesion.       Vagina: Normal. No vaginal discharge or lesions.      Cervix: No cervical motion tenderness, discharge, lesion or cervical bleeding.      Uterus: Normal. Not enlarged, not fixed and not tender.       Adnexa:         Right: No mass or tenderness.          Left: No mass or tenderness.        Rectum: No external hemorrhoid.      Comments: Chaperone Present  Musculoskeletal:      Cervical back: Normal range of motion. No muscular tenderness.   Neurological:      Mental Status: She is alert and oriented to person, place, and time.   Psychiatric:         Behavior: Behavior normal.            Assessment and Plan    Problem List Items Addressed This Visit     None          1. GYN annual well woman exam.   2. Reviewed monthly self breast exams.  Instructed to call with lumps, pain, or breast discharge.  Yearly mammograms ordered.  3. Recommended use of Vitamin D and getting adequate calcium in her diet. (1500mg)  4. Colonoscopy recommended.  5. Symptoms of menopausal transition reviewed with patient.   6. RTC in 1 year or PRN with problems.  7. Other: Patient had thought she had cystocele but she has a only a minor cystocele which is not prolapse.  It is not causing her any problems and we have reassured her.   8. She has had a hysterectomy    Antwan Escobar MD  03/30/2021   [place]

## 2024-09-18 ENCOUNTER — OFFICE VISIT (OUTPATIENT)
Dept: PULMONOLOGY | Facility: CLINIC | Age: 79
End: 2024-09-18
Payer: MEDICARE

## 2024-09-18 VITALS
WEIGHT: 170 LBS | DIASTOLIC BLOOD PRESSURE: 80 MMHG | SYSTOLIC BLOOD PRESSURE: 126 MMHG | BODY MASS INDEX: 29.02 KG/M2 | HEIGHT: 64 IN | OXYGEN SATURATION: 98 % | HEART RATE: 86 BPM | TEMPERATURE: 97.6 F

## 2024-09-18 DIAGNOSIS — R91.1 LUNG NODULE: Primary | ICD-10-CM

## (undated) DEVICE — ADAPT BRONCH ILLUMISITE FOR OLYMP/190

## (undated) DEVICE — FRCP BIOP SUPERDIMENSION PREMARK

## (undated) DEVICE — TRAP,MUCUS SPECIMEN,40CC: Brand: MEDLINE

## (undated) DEVICE — ADAPT SWVL FIBROPTIC BRONCH

## (undated) DEVICE — BOWL UTIL STRL 32OZ

## (undated) DEVICE — LUER-LOK 360°: Brand: CONNECTA, LUER-LOK

## (undated) DEVICE — Device: Brand: BALLOON

## (undated) DEVICE — ST EXT MICROBORE FIX M LL 38IN

## (undated) DEVICE — SINGLE USE BIOPSY VALVE MAJ-210: Brand: SINGLE USE BIOPSY VALVE (STERILE)

## (undated) DEVICE — Device: Brand: SINGLE USE ASPIRATION NEEDLE NA-U401SX

## (undated) DEVICE — SINGLE USE SUCTION VALVE MAJ-209: Brand: SINGLE USE SUCTION VALVE (STERILE)

## (undated) DEVICE — NDL PULM SUPERDIMENSION ARCPOINT 18G